# Patient Record
Sex: FEMALE | Race: WHITE | Employment: OTHER | URBAN - METROPOLITAN AREA
[De-identification: names, ages, dates, MRNs, and addresses within clinical notes are randomized per-mention and may not be internally consistent; named-entity substitution may affect disease eponyms.]

---

## 2018-08-31 PROBLEM — R07.2 PRECORDIAL PAIN: Status: ACTIVE | Noted: 2018-08-31

## 2018-08-31 PROBLEM — I10 HTN (HYPERTENSION), BENIGN: Status: ACTIVE | Noted: 2018-08-31

## 2018-08-31 PROBLEM — E11.51 TYPE 2 DIABETES MELLITUS WITH DIABETIC PERIPHERAL ANGIOPATHY WITHOUT GANGRENE, WITHOUT LONG-TERM CURRENT USE OF INSULIN (HCC): Status: ACTIVE | Noted: 2018-08-31

## 2018-08-31 PROBLEM — I73.9 CLAUDICATION (HCC): Status: ACTIVE | Noted: 2018-08-31

## 2018-08-31 PROBLEM — I73.9 PVD (PERIPHERAL VASCULAR DISEASE) (HCC): Status: ACTIVE | Noted: 2018-08-31

## 2018-08-31 PROBLEM — R06.00 DYSPNEA ON EXERTION: Status: ACTIVE | Noted: 2018-08-31

## 2019-01-11 PROBLEM — I35.0 NONRHEUMATIC AORTIC VALVE STENOSIS: Status: ACTIVE | Noted: 2019-01-11

## 2019-04-05 PROBLEM — E78.2 MIXED HYPERLIPIDEMIA: Status: ACTIVE | Noted: 2019-04-05

## 2020-05-18 NOTE — IMAGING NOTE
05/18 @ 9273 Left a detailed message with call back number inquiring about CT contrast dye allergy.  IBSM

## 2020-05-18 NOTE — PROGRESS NOTES
Per Tiffanieblaire Resendiz, no sign or symptoms of covid and no recent travel or exposure. Pt ok for cardiac diagnostic outpatient appointment.

## 2020-05-19 ENCOUNTER — HOSPITAL ENCOUNTER (OUTPATIENT)
Dept: LAB | Facility: HOSPITAL | Age: 70
Discharge: HOME | End: 2020-05-19
Attending: NURSE PRACTITIONER
Payer: MEDICARE

## 2020-05-19 ENCOUNTER — HOSPITAL ENCOUNTER (OUTPATIENT)
Dept: CARDIOLOGY CLINIC | Facility: HOSPITAL | Age: 70
Discharge: HOME | End: 2020-05-19
Attending: NURSE PRACTITIONER
Payer: MEDICARE

## 2020-05-19 ENCOUNTER — NURSE ONLY (OUTPATIENT)
Dept: RESPIRATORY THERAPY | Facility: HOSPITAL | Age: 70
End: 2020-05-19
Attending: NURSE PRACTITIONER
Payer: MEDICARE

## 2020-05-19 ENCOUNTER — HOSPITAL ENCOUNTER (OUTPATIENT)
Dept: CV DIAGNOSTICS | Facility: HOSPITAL | Age: 70
Discharge: HOME | End: 2020-05-19
Attending: NURSE PRACTITIONER
Payer: MEDICARE

## 2020-05-19 ENCOUNTER — HOSPITAL ENCOUNTER (OUTPATIENT)
Dept: CT IMAGING | Facility: HOSPITAL | Age: 70
Discharge: HOME | End: 2020-05-19
Attending: NURSE PRACTITIONER
Payer: MEDICARE

## 2020-05-19 VITALS — DIASTOLIC BLOOD PRESSURE: 57 MMHG | HEART RATE: 74 BPM | SYSTOLIC BLOOD PRESSURE: 116 MMHG

## 2020-05-19 DIAGNOSIS — I35.0 SEVERE AORTIC STENOSIS: ICD-10-CM

## 2020-05-19 DIAGNOSIS — R09.89 OTHER SPECIFIED SYMPTOMS AND SIGNS INVOLVING THE CIRCULATORY AND RESPIRATORY SYSTEMS: ICD-10-CM

## 2020-05-19 PROCEDURE — 71275 CT ANGIOGRAPHY CHEST: CPT | Performed by: NURSE PRACTITIONER

## 2020-05-19 PROCEDURE — 36415 COLL VENOUS BLD VENIPUNCTURE: CPT | Performed by: NURSE PRACTITIONER

## 2020-05-19 PROCEDURE — 85025 COMPLETE CBC W/AUTO DIFF WBC: CPT | Performed by: NURSE PRACTITIONER

## 2020-05-19 PROCEDURE — 93005 ELECTROCARDIOGRAM TRACING: CPT

## 2020-05-19 PROCEDURE — 80048 BASIC METABOLIC PNL TOTAL CA: CPT | Performed by: NURSE PRACTITIONER

## 2020-05-19 PROCEDURE — 93010 ELECTROCARDIOGRAM REPORT: CPT | Performed by: INTERNAL MEDICINE

## 2020-05-19 PROCEDURE — 94010 BREATHING CAPACITY TEST: CPT

## 2020-05-19 PROCEDURE — 75635 CT ANGIO ABDOMINAL ARTERIES: CPT | Performed by: NURSE PRACTITIONER

## 2020-05-19 NOTE — RESPIRATORY THERAPY NOTE
Bedside simple spirometry ( including FVC,FEV1 ) performed. Well tolerated by patient. Good effort. Preliminary results printed out and placed in Pulmonary physicians bin in RT dept for interpretation.  Copy also faxed to [facility] cardiology office

## 2020-05-19 NOTE — IMAGING NOTE
Received pt to CT 4, GE. Pt verified name, , and allergies. Pt states she has taken 13 hour premedication as advised for documented contrast allergy. Injection of contrast at 1223.   Contrast= 95 ml  Saline= 101 ml  Average HR= 75  Pt tolerated exam w

## 2020-05-19 NOTE — CONSULTS
Cardiothoracic Surgery  Structural Heart Consult       Referring: Dr Saturnino Gunter  PCP: Dr Nancy Neal  Reason for consult: severe symptomatic aortic stenosis  81year old with symptomatic aortic stenosis with increased SOB, MELENDEZ, and fatigue  Echo:    GREGORY: .67 cm2 cath was reviewed  Labs were reviewed and pre-op labs have been ordered  CT of chest was ordered    Pt was seen and examined by me today and after reviewing the chart and talking with the patient   the assessment is that the patient is at intermediate risk

## 2020-05-19 NOTE — IMAGING NOTE
Blood sugar reported to Jani LONG. She states this will be brought to the attention of Radha LONG in structural heart clinic.

## 2020-05-22 NOTE — PROCEDURES
Spirometry Findings:  FEV1 is 2.29L, 108% predicted. FVC is 2.64L, 98% predicted. FEV1/ FVC ratio is 0.87. The flow-volume loop demonstrates a normal pattern.     Impression:  There is no airway obstruction on spirometry and visualized   on flow-volume l

## 2020-06-19 ENCOUNTER — LAB ENCOUNTER (OUTPATIENT)
Dept: LAB | Facility: HOSPITAL | Age: 70
DRG: 267 | End: 2020-06-19
Attending: INTERNAL MEDICINE
Payer: MEDICARE

## 2020-06-19 DIAGNOSIS — Z01.818 PRE-OP TESTING: ICD-10-CM

## 2020-06-22 ENCOUNTER — ANESTHESIA EVENT (OUTPATIENT)
Dept: CARDIAC SURGERY | Facility: HOSPITAL | Age: 70
DRG: 267 | End: 2020-06-22
Payer: MEDICARE

## 2020-06-22 ENCOUNTER — APPOINTMENT (OUTPATIENT)
Dept: GENERAL RADIOLOGY | Facility: HOSPITAL | Age: 70
DRG: 267 | End: 2020-06-22
Attending: NURSE PRACTITIONER
Payer: MEDICARE

## 2020-06-22 ENCOUNTER — HOSPITAL ENCOUNTER (INPATIENT)
Facility: HOSPITAL | Age: 70
LOS: 2 days | Discharge: HOME | DRG: 267 | End: 2020-06-24
Attending: INTERNAL MEDICINE | Admitting: INTERNAL MEDICINE
Payer: MEDICARE

## 2020-06-22 DIAGNOSIS — Z01.818 PRE-OP TESTING: Primary | ICD-10-CM

## 2020-06-22 LAB
ALBUMIN SERPL-MCNC: 3.2 G/DL (ref 3.4–5)
ALBUMIN/GLOB SERPL: 0.8 {RATIO} (ref 1–2)
ALP LIVER SERPL-CCNC: 106 U/L (ref 55–142)
ALT SERPL-CCNC: 22 U/L (ref 13–56)
ANION GAP SERPL CALC-SCNC: 5 MMOL/L (ref 0–18)
ANTIBODY SCREEN: NEGATIVE
AST SERPL-CCNC: 19 U/L (ref 15–37)
ATRIAL RATE: 69 BPM
BILIRUB SERPL-MCNC: 0.4 MG/DL (ref 0.1–2)
BUN BLD-MCNC: 18 MG/DL (ref 7–18)
BUN/CREAT SERPL: 20.5 (ref 10–20)
CALCIUM BLD-MCNC: 8.9 MG/DL (ref 8.5–10.1)
CHLORIDE SERPL-SCNC: 106 MMOL/L (ref 98–112)
CO2 SERPL-SCNC: 27 MMOL/L (ref 21–32)
CREAT BLD-MCNC: 0.88 MG/DL (ref 0.55–1.02)
DEPRECATED RDW RBC AUTO: 42.8 FL (ref 35.1–46.3)
ERYTHROCYTE [DISTWIDTH] IN BLOOD BY AUTOMATED COUNT: 13.8 % (ref 11–15)
EST. AVERAGE GLUCOSE BLD GHB EST-MCNC: 232 MG/DL (ref 68–126)
GLOBULIN PLAS-MCNC: 3.8 G/DL (ref 2.8–4.4)
GLUCOSE BLD-MCNC: 199 MG/DL (ref 70–99)
GLUCOSE BLD-MCNC: 209 MG/DL (ref 70–99)
GLUCOSE BLD-MCNC: 221 MG/DL (ref 70–99)
GLUCOSE BLD-MCNC: 224 MG/DL (ref 70–99)
HBA1C MFR BLD HPLC: 9.7 % (ref ?–5.7)
HCT VFR BLD AUTO: 36.3 % (ref 35–48)
HGB BLD-MCNC: 11.6 G/DL (ref 12–16)
INR BLD: 1.04 (ref 0.89–1.11)
M PROTEIN MFR SERPL ELPH: 7 G/DL (ref 6.4–8.2)
MCH RBC QN AUTO: 27.2 PG (ref 26–34)
MCHC RBC AUTO-ENTMCNC: 32 G/DL (ref 31–37)
MCV RBC AUTO: 85 FL (ref 80–100)
NT-PROBNP SERPL-MCNC: 109 PG/ML (ref ?–125)
OSMOLALITY SERPL CALC.SUM OF ELEC: 295 MOSM/KG (ref 275–295)
P AXIS: 33 DEGREES
P-R INTERVAL: 154 MS
PLATELET # BLD AUTO: 169 10(3)UL (ref 150–450)
POTASSIUM SERPL-SCNC: 4.2 MMOL/L (ref 3.5–5.1)
PSA SERPL DL<=0.01 NG/ML-MCNC: 13.9 SECONDS (ref 12.4–14.6)
Q-T INTERVAL: 410 MS
QRS DURATION: 76 MS
QTC CALCULATION (BEZET): 439 MS
R AXIS: 19 DEGREES
RBC # BLD AUTO: 4.27 X10(6)UL (ref 3.8–5.3)
RH BLOOD TYPE: NEGATIVE
SODIUM SERPL-SCNC: 138 MMOL/L (ref 136–145)
T AXIS: 19 DEGREES
VENTRICULAR RATE: 69 BPM
WBC # BLD AUTO: 8.2 X10(3) UL (ref 4–11)

## 2020-06-22 PROCEDURE — 71045 X-RAY EXAM CHEST 1 VIEW: CPT | Performed by: NURSE PRACTITIONER

## 2020-06-22 PROCEDURE — 85610 PROTHROMBIN TIME: CPT | Performed by: NURSE PRACTITIONER

## 2020-06-22 PROCEDURE — 87081 CULTURE SCREEN ONLY: CPT | Performed by: NURSE PRACTITIONER

## 2020-06-22 PROCEDURE — 76937 US GUIDE VASCULAR ACCESS: CPT

## 2020-06-22 PROCEDURE — 86920 COMPATIBILITY TEST SPIN: CPT

## 2020-06-22 PROCEDURE — 85027 COMPLETE CBC AUTOMATED: CPT | Performed by: NURSE PRACTITIONER

## 2020-06-22 PROCEDURE — 93005 ELECTROCARDIOGRAM TRACING: CPT

## 2020-06-22 PROCEDURE — 80053 COMPREHEN METABOLIC PANEL: CPT | Performed by: NURSE PRACTITIONER

## 2020-06-22 PROCEDURE — 86850 RBC ANTIBODY SCREEN: CPT | Performed by: NURSE PRACTITIONER

## 2020-06-22 PROCEDURE — 86901 BLOOD TYPING SEROLOGIC RH(D): CPT | Performed by: NURSE PRACTITIONER

## 2020-06-22 PROCEDURE — 83880 ASSAY OF NATRIURETIC PEPTIDE: CPT | Performed by: NURSE PRACTITIONER

## 2020-06-22 PROCEDURE — 82962 GLUCOSE BLOOD TEST: CPT

## 2020-06-22 PROCEDURE — 93010 ELECTROCARDIOGRAM REPORT: CPT | Performed by: INTERNAL MEDICINE

## 2020-06-22 PROCEDURE — 83036 HEMOGLOBIN GLYCOSYLATED A1C: CPT | Performed by: NURSE PRACTITIONER

## 2020-06-22 PROCEDURE — 86900 BLOOD TYPING SEROLOGIC ABO: CPT | Performed by: NURSE PRACTITIONER

## 2020-06-22 RX ORDER — BUPROPION HYDROCHLORIDE 150 MG/1
450 TABLET ORAL DAILY
Status: DISCONTINUED | OUTPATIENT
Start: 2020-06-23 | End: 2020-06-24

## 2020-06-22 RX ORDER — ACETAMINOPHEN 160 MG
2000 TABLET,DISINTEGRATING ORAL 2 TIMES DAILY
Status: DISCONTINUED | OUTPATIENT
Start: 2020-06-22 | End: 2020-06-24

## 2020-06-22 RX ORDER — LEVOTHYROXINE SODIUM 0.05 MG/1
50 TABLET ORAL
Status: DISCONTINUED | OUTPATIENT
Start: 2020-06-23 | End: 2020-06-24

## 2020-06-22 RX ORDER — DEXTROSE MONOHYDRATE 25 G/50ML
50 INJECTION, SOLUTION INTRAVENOUS
Status: DISCONTINUED | OUTPATIENT
Start: 2020-06-22 | End: 2020-06-24

## 2020-06-22 RX ORDER — METOPROLOL SUCCINATE 50 MG/1
50 TABLET, EXTENDED RELEASE ORAL DAILY
Status: DISCONTINUED | OUTPATIENT
Start: 2020-06-23 | End: 2020-06-22

## 2020-06-22 RX ORDER — ATORVASTATIN CALCIUM 20 MG/1
20 TABLET, FILM COATED ORAL NIGHTLY
Status: DISCONTINUED | OUTPATIENT
Start: 2020-06-22 | End: 2020-06-24

## 2020-06-22 RX ORDER — METOPROLOL SUCCINATE 50 MG/1
50 TABLET, EXTENDED RELEASE ORAL DAILY
Status: DISCONTINUED | OUTPATIENT
Start: 2020-06-22 | End: 2020-06-23

## 2020-06-22 RX ORDER — SODIUM CHLORIDE 9 MG/ML
INJECTION, SOLUTION INTRAVENOUS CONTINUOUS
Status: DISCONTINUED | OUTPATIENT
Start: 2020-06-22 | End: 2020-06-23

## 2020-06-22 RX ORDER — PANTOPRAZOLE SODIUM 20 MG/1
20 TABLET, DELAYED RELEASE ORAL
Status: DISCONTINUED | OUTPATIENT
Start: 2020-06-23 | End: 2020-06-24

## 2020-06-22 RX ORDER — SERTRALINE HYDROCHLORIDE 100 MG/1
100 TABLET, FILM COATED ORAL DAILY
Status: DISCONTINUED | OUTPATIENT
Start: 2020-06-23 | End: 2020-06-24

## 2020-06-22 RX ORDER — TRAMADOL HYDROCHLORIDE 50 MG/1
50 TABLET ORAL EVERY 6 HOURS PRN
Status: DISCONTINUED | OUTPATIENT
Start: 2020-06-22 | End: 2020-06-24

## 2020-06-22 RX ORDER — ACETAMINOPHEN 325 MG/1
650 TABLET ORAL EVERY 6 HOURS PRN
Status: DISCONTINUED | OUTPATIENT
Start: 2020-06-22 | End: 2020-06-24

## 2020-06-22 RX ORDER — CHLORHEXIDINE GLUCONATE 4 G/100ML
30 SOLUTION TOPICAL ONCE
Status: COMPLETED | OUTPATIENT
Start: 2020-06-22 | End: 2020-06-22

## 2020-06-22 NOTE — H&P
[facility] Cardiology  History and Physical    [facility] Patient Status:  Inpatient    1950 MRN [id number]   [facility] 8NE-A Attending Deanna Dyer MD   Hosp Day # 0 PCP Linh Garcia     ADDENDUM:  The patient of the TAVR procedure. Patient verbalized understanding and agree with plan of care. Objective:  /50   Pulse 75   Wt 198 lb 13.7 oz (90.2 kg)   BMI 36.37 kg/m²   No data recorded.     No intake or output data in the 24 hours ending 06/22/20 124 tolerating medications with no reported side effects.     They currently use no assistive devices or home health services.  They do not use home oxygen.     Previous HX:   Denies the following:  Infective endocarditis  Pacemaker, ICD  CABG, PCI, other cardi mg by mouth daily. , Disp: , Rfl:   •  Amphetamine-Dextroamphet ER 20 MG Oral Capsule SR 24 Hr, Take 20 mg by mouth daily. , Disp: , Rfl:   •  buPROPion HCl ER, XL, 450 MG Oral Tablet 24 Hr, Take 450 mg by mouth daily. , Disp: , Rfl: 0  •  Metoprolol Succ normoactive BS, soft, non-distended; non-tender; no masses or bruits     STS, KCCQ & 5 meter walk:   STS 2.97% and EURO 1.98 %.       SYLVESTER score is 6/6 .       ECHO 3/18//20 at [address] [place] [address]: See full report   Summary:     1. Left ventricle:  The cavity size is · Patient will be seen by a CV surgeon     HTN (hypertension), benign  · BP elevated today. Pt anxious about visit. She states BP at home in am usually 120's/60's.    · Recommend checking BP at home daily and recording  ·    Mixed hyperlipidemia  · On ronald

## 2020-06-22 NOTE — PROGRESS NOTES
Pt oriented to room. Call light in reach. Menu in room. Tele monitor on. VS taken. Head to toe complete. Admission Navigators complete including PTA medications. Consults aware of proper medications and of new patient. Patient's needs met by staff.

## 2020-06-22 NOTE — PLAN OF CARE
KCCQ complete. Blood consent signed and in chart. MRSA swab sent.    5 Meter Walk complete:  1st: 10.70 sec  2nd: 10.30 sec  3rd: 8.70 sec    Problem: Diabetes/Glucose Control  Goal: Glucose maintained within prescribed range  Description  INTERVENTIONS:  - baseline  Description  INTERVENTIONS:  - Continuous cardiac monitoring, monitor vital signs, obtain 12 lead EKG if indicated  - Evaluate effectiveness of antiarrhythmic and heart rate control medications as ordered  - Initiate emergency measures for life t

## 2020-06-22 NOTE — ANESTHESIA PREPROCEDURE EVALUATION
PRE-OP EVALUATION    Patient Name: Wilfred Mata    Pre-op Diagnosis: aortic stenosis    Procedure(s):  transcatheter aortic valve replacement, 26mm Evolut Pro    Surgeon(s) and Role:     * Allie Jasmine MD - Primary     * Esperanza Montalvo MD    Pre-op Endo/Other      (+) diabetes  type 2,                          Pulmonary               (+) shortness of breath            Neuro/Psych                                    Past Surgical History:   Procedure Laterality Date   • ANGIOGRAM  02/

## 2020-06-22 NOTE — CONSULTS
[facility] Hospitalist H&P       CC: medicine consult for comanagement of medical conditions     PCP: Veda Servin    History of Present Illness: Patient is a 81year old female with PMH sig for T2DM, HTN, HL, MONIQUE and hypothyroidism is admitted for TAVR.   P mouth before breakfast., Disp: , Rfl:   insulin detemir (LEVEMIR FLEXTOUCH) 100 UNIT/ML Subcutaneous Solution Pen-injector, Inject into the skin nightly., Disp: , Rfl:   Lansoprazole 15 MG Oral Capsule Delayed Release, Take 15 mg by mouth daily. , Disp: , R Neurologic: Normal strength, no focal deficit appreciated     Diagnostic Data:    CBC/Chem  No results for input(s): WBC, HGB, MCV, PLT, BAND, INR in the last 168 hours.     Invalid input(s): LYM#, MONO#, BASOS#, EOSIN#    No results for input(s): NA, K,

## 2020-06-23 ENCOUNTER — ANESTHESIA (OUTPATIENT)
Dept: CARDIAC SURGERY | Facility: HOSPITAL | Age: 70
DRG: 267 | End: 2020-06-23
Payer: MEDICARE

## 2020-06-23 ENCOUNTER — APPOINTMENT (OUTPATIENT)
Dept: CV DIAGNOSTICS | Facility: HOSPITAL | Age: 70
DRG: 267 | End: 2020-06-23
Attending: INTERNAL MEDICINE
Payer: MEDICARE

## 2020-06-23 ENCOUNTER — APPOINTMENT (OUTPATIENT)
Dept: GENERAL RADIOLOGY | Facility: HOSPITAL | Age: 70
DRG: 267 | End: 2020-06-23
Attending: NURSE PRACTITIONER
Payer: MEDICARE

## 2020-06-23 ENCOUNTER — APPOINTMENT (OUTPATIENT)
Dept: CV DIAGNOSTICS | Facility: HOSPITAL | Age: 70
DRG: 267 | End: 2020-06-23
Attending: NURSE PRACTITIONER
Payer: MEDICARE

## 2020-06-23 LAB
ALBUMIN SERPL-MCNC: 2.3 G/DL (ref 3.4–5)
ALBUMIN/GLOB SERPL: 0.8 {RATIO} (ref 1–2)
ALP LIVER SERPL-CCNC: 63 U/L (ref 55–142)
ALT SERPL-CCNC: 17 U/L (ref 13–56)
ANION GAP SERPL CALC-SCNC: 4 MMOL/L (ref 0–18)
ANION GAP SERPL CALC-SCNC: 5 MMOL/L (ref 0–18)
ARTERIAL BLD GAS O2 SATURATION: 97 % (ref 92–100)
ARTERIAL BLOOD GAS BASE EXCESS: -4.9 MMOL/L (ref ?–2)
ARTERIAL BLOOD GAS HCO3: 20.8 MEQ/L (ref 22–26)
ARTERIAL BLOOD GAS PCO2: 40 MM HG (ref 35–45)
ARTERIAL BLOOD GAS PH: 7.33 (ref 7.35–7.45)
ARTERIAL BLOOD GAS PO2: 117 MM HG (ref 80–105)
AST SERPL-CCNC: 17 U/L (ref 15–37)
ATRIAL RATE: 75 BPM
BASOPHILS # BLD AUTO: 0.02 X10(3) UL (ref 0–0.2)
BASOPHILS NFR BLD AUTO: 0.2 %
BILIRUB SERPL-MCNC: 0.4 MG/DL (ref 0.1–2)
BUN BLD-MCNC: 17 MG/DL (ref 7–18)
BUN BLD-MCNC: 18 MG/DL (ref 7–18)
BUN/CREAT SERPL: 20.7 (ref 10–20)
BUN/CREAT SERPL: 22.5 (ref 10–20)
CALCIUM BLD-MCNC: 7.2 MG/DL (ref 8.5–10.1)
CALCIUM BLD-MCNC: 8.9 MG/DL (ref 8.5–10.1)
CALCULATED O2 SATURATION: 98 % (ref 92–100)
CARBOXYHEMOGLOBIN: 1.2 % SAT (ref 0–3)
CHLORIDE SERPL-SCNC: 108 MMOL/L (ref 98–112)
CHLORIDE SERPL-SCNC: 113 MMOL/L (ref 98–112)
CO2 SERPL-SCNC: 24 MMOL/L (ref 21–32)
CO2 SERPL-SCNC: 28 MMOL/L (ref 21–32)
CREAT BLD-MCNC: 0.8 MG/DL (ref 0.55–1.02)
CREAT BLD-MCNC: 0.82 MG/DL (ref 0.55–1.02)
DEPRECATED RDW RBC AUTO: 42.5 FL (ref 35.1–46.3)
DEPRECATED RDW RBC AUTO: 42.5 FL (ref 35.1–46.3)
DEPRECATED RDW RBC AUTO: 43.3 FL (ref 35.1–46.3)
EOSINOPHIL # BLD AUTO: 0.01 X10(3) UL (ref 0–0.7)
EOSINOPHIL NFR BLD AUTO: 0.1 %
ERYTHROCYTE [DISTWIDTH] IN BLOOD BY AUTOMATED COUNT: 13.8 % (ref 11–15)
ERYTHROCYTE [DISTWIDTH] IN BLOOD BY AUTOMATED COUNT: 13.8 % (ref 11–15)
ERYTHROCYTE [DISTWIDTH] IN BLOOD BY AUTOMATED COUNT: 14.1 % (ref 11–15)
EST. AVERAGE GLUCOSE BLD GHB EST-MCNC: 235 MG/DL (ref 68–126)
FIO2: 40 %
GLOBULIN PLAS-MCNC: 2.9 G/DL (ref 2.8–4.4)
GLUCOSE BLD-MCNC: 112 MG/DL (ref 70–99)
GLUCOSE BLD-MCNC: 124 MG/DL (ref 70–99)
GLUCOSE BLD-MCNC: 146 MG/DL (ref 70–99)
GLUCOSE BLD-MCNC: 154 MG/DL (ref 70–99)
GLUCOSE BLD-MCNC: 179 MG/DL (ref 70–99)
GLUCOSE BLD-MCNC: 222 MG/DL (ref 70–99)
GLUCOSE BLD-MCNC: 223 MG/DL (ref 70–99)
GLUCOSE BLD-MCNC: 269 MG/DL (ref 70–99)
GLUCOSE BLD-MCNC: 285 MG/DL (ref 70–99)
HAV IGM SER QL: 2 MG/DL (ref 1.6–2.6)
HBA1C MFR BLD HPLC: 9.8 % (ref ?–5.7)
HCT VFR BLD AUTO: 34.9 % (ref 35–48)
HCT VFR BLD AUTO: 35.6 % (ref 35–48)
HCT VFR BLD AUTO: 36.1 % (ref 35–48)
HGB BLD-MCNC: 11.3 G/DL (ref 12–16)
HGB BLD-MCNC: 11.4 G/DL (ref 12–16)
HGB BLD-MCNC: 11.9 G/DL (ref 12–16)
IMM GRANULOCYTES # BLD AUTO: 0.07 X10(3) UL (ref 0–1)
IMM GRANULOCYTES NFR BLD: 0.6 %
ISTAT ACTIVATED CLOTTING TIME: 142 SECONDS (ref 74–137)
ISTAT ACTIVATED CLOTTING TIME: 147 SECONDS (ref 74–137)
ISTAT ACTIVATED CLOTTING TIME: 169 SECONDS (ref 74–137)
ISTAT ACTIVATED CLOTTING TIME: 401 SECONDS (ref 74–137)
ISTAT BLOOD GAS BASE EXCESS: -1 MMOL/L (ref ?–30)
ISTAT BLOOD GAS BASE EXCESS: -2 MMOL/L (ref ?–30)
ISTAT BLOOD GAS BASE EXCESS: -3 MMOL/L (ref ?–30)
ISTAT BLOOD GAS HCO3: 23.1 MEQ/L (ref 22–26)
ISTAT BLOOD GAS HCO3: 23.3 MEQ/L (ref 22–26)
ISTAT BLOOD GAS HCO3: 25.8 MEQ/L (ref 22–26)
ISTAT BLOOD GAS O2 SATURATION: 100 % (ref 92–100)
ISTAT BLOOD GAS O2 SATURATION: 100 % (ref 92–100)
ISTAT BLOOD GAS O2 SATURATION: 99 % (ref 92–100)
ISTAT BLOOD GAS PCO2: 42 MMHG (ref 35–45)
ISTAT BLOOD GAS PCO2: 45.7 MMHG (ref 35–45)
ISTAT BLOOD GAS PCO2: 54.9 MMHG (ref 35–45)
ISTAT BLOOD GAS PH: 7.28 (ref 7.35–7.45)
ISTAT BLOOD GAS PH: 7.31 (ref 7.35–7.45)
ISTAT BLOOD GAS PH: 7.35 (ref 7.35–7.45)
ISTAT BLOOD GAS PO2: 154 MMHG (ref 80–105)
ISTAT BLOOD GAS PO2: 322 MMHG (ref 80–105)
ISTAT BLOOD GAS PO2: 329 MMHG (ref 80–105)
ISTAT BLOOD GAS TCO2: 25 MMOL/L (ref 22–32)
ISTAT BLOOD GAS TCO2: 25 MMOL/L (ref 22–32)
ISTAT BLOOD GAS TCO2: 27 MMOL/L (ref 22–32)
ISTAT HEMATOCRIT: 25 % (ref 34–50)
ISTAT HEMATOCRIT: 27 % (ref 34–50)
ISTAT HEMATOCRIT: 31 % (ref 34–50)
ISTAT IONIZED CALCIUM: 1.09 MMOL/L (ref 1.12–1.32)
ISTAT IONIZED CALCIUM: 1.11 MMOL/L (ref 1.12–1.32)
ISTAT IONIZED CALCIUM: 1.22 MMOL/L (ref 1.12–1.32)
ISTAT POTASSIUM: 3.6 MMOL/L (ref 3.6–5.1)
ISTAT POTASSIUM: 4 MMOL/L (ref 3.6–5.1)
ISTAT POTASSIUM: 4.3 MMOL/L (ref 3.6–5.1)
ISTAT SODIUM: 141 MMOL/L (ref 136–145)
ISTAT SODIUM: 142 MMOL/L (ref 136–145)
ISTAT SODIUM: 144 MMOL/L (ref 136–145)
LYMPHOCYTES # BLD AUTO: 0.79 X10(3) UL (ref 1–4)
LYMPHOCYTES NFR BLD AUTO: 6.6 %
M PROTEIN MFR SERPL ELPH: 5.2 G/DL (ref 6.4–8.2)
MCH RBC QN AUTO: 27.2 PG (ref 26–34)
MCH RBC QN AUTO: 27.4 PG (ref 26–34)
MCH RBC QN AUTO: 27.5 PG (ref 26–34)
MCHC RBC AUTO-ENTMCNC: 32 G/DL (ref 31–37)
MCHC RBC AUTO-ENTMCNC: 32.4 G/DL (ref 31–37)
MCHC RBC AUTO-ENTMCNC: 33 G/DL (ref 31–37)
MCV RBC AUTO: 83.2 FL (ref 80–100)
MCV RBC AUTO: 83.9 FL (ref 80–100)
MCV RBC AUTO: 85.8 FL (ref 80–100)
METHEMOGLOBIN: 0.6 % SAT (ref 0.4–1.5)
MONOCYTES # BLD AUTO: 0.23 X10(3) UL (ref 0.1–1)
MONOCYTES NFR BLD AUTO: 1.9 %
NEUTROPHILS # BLD AUTO: 10.87 X10 (3) UL (ref 1.5–7.7)
NEUTROPHILS # BLD AUTO: 10.87 X10(3) UL (ref 1.5–7.7)
NEUTROPHILS NFR BLD AUTO: 90.6 %
OSMOLALITY SERPL CALC.SUM OF ELEC: 295 MOSM/KG (ref 275–295)
OSMOLALITY SERPL CALC.SUM OF ELEC: 302 MOSM/KG (ref 275–295)
P AXIS: 43 DEGREES
P-R INTERVAL: 164 MS
P/F RATIO: 301.9 MMHG
PATIENT TEMPERATURE: 97.6 F
PEEP: 5 CM H2O
PLATELET # BLD AUTO: 147 10(3)UL (ref 150–450)
PLATELET # BLD AUTO: 172 10(3)UL (ref 150–450)
PLATELET # BLD AUTO: 183 10(3)UL (ref 150–450)
POTASSIUM SERPL-SCNC: 3.8 MMOL/L (ref 3.5–5.1)
POTASSIUM SERPL-SCNC: 4.3 MMOL/L (ref 3.5–5.1)
PRESSURE SUPPORT: 5 CM H2O
Q-T INTERVAL: 422 MS
QRS DURATION: 74 MS
QTC CALCULATION (BEZET): 471 MS
R AXIS: 11 DEGREES
RBC # BLD AUTO: 4.15 X10(6)UL (ref 3.8–5.3)
RBC # BLD AUTO: 4.16 X10(6)UL (ref 3.8–5.3)
RBC # BLD AUTO: 4.34 X10(6)UL (ref 3.8–5.3)
SODIUM SERPL-SCNC: 140 MMOL/L (ref 136–145)
SODIUM SERPL-SCNC: 142 MMOL/L (ref 136–145)
T AXIS: 23 DEGREES
TOTAL HEMOGLOBIN: 11.8 G/DL (ref 11.7–16)
VENTRICULAR RATE: 75 BPM
WBC # BLD AUTO: 12 X10(3) UL (ref 4–11)
WBC # BLD AUTO: 18.7 X10(3) UL (ref 4–11)
WBC # BLD AUTO: 8.8 X10(3) UL (ref 4–11)

## 2020-06-23 PROCEDURE — 94150 VITAL CAPACITY TEST: CPT

## 2020-06-23 PROCEDURE — 83036 HEMOGLOBIN GLYCOSYLATED A1C: CPT | Performed by: HOSPITALIST

## 2020-06-23 PROCEDURE — 83050 HGB METHEMOGLOBIN QUAN: CPT | Performed by: THORACIC SURGERY (CARDIOTHORACIC VASCULAR SURGERY)

## 2020-06-23 PROCEDURE — 84132 ASSAY OF SERUM POTASSIUM: CPT

## 2020-06-23 PROCEDURE — 82803 BLOOD GASES ANY COMBINATION: CPT | Performed by: THORACIC SURGERY (CARDIOTHORACIC VASCULAR SURGERY)

## 2020-06-23 PROCEDURE — 93355 ECHO TRANSESOPHAGEAL (TEE): CPT | Performed by: INTERNAL MEDICINE

## 2020-06-23 PROCEDURE — 85027 COMPLETE CBC AUTOMATED: CPT | Performed by: NURSE PRACTITIONER

## 2020-06-23 PROCEDURE — B410YZZ FLUOROSCOPY OF ABDOMINAL AORTA USING OTHER CONTRAST: ICD-10-PCS | Performed by: INTERNAL MEDICINE

## 2020-06-23 PROCEDURE — 85347 COAGULATION TIME ACTIVATED: CPT

## 2020-06-23 PROCEDURE — 80048 BASIC METABOLIC PNL TOTAL CA: CPT | Performed by: HOSPITALIST

## 2020-06-23 PROCEDURE — B310YZZ FLUOROSCOPY OF THORACIC AORTA USING OTHER CONTRAST: ICD-10-PCS | Performed by: INTERNAL MEDICINE

## 2020-06-23 PROCEDURE — B41FYZZ FLUOROSCOPY OF RIGHT LOWER EXTREMITY ARTERIES USING OTHER CONTRAST: ICD-10-PCS | Performed by: INTERNAL MEDICINE

## 2020-06-23 PROCEDURE — 71045 X-RAY EXAM CHEST 1 VIEW: CPT | Performed by: NURSE PRACTITIONER

## 2020-06-23 PROCEDURE — 82375 ASSAY CARBOXYHB QUANT: CPT | Performed by: THORACIC SURGERY (CARDIOTHORACIC VASCULAR SURGERY)

## 2020-06-23 PROCEDURE — 83735 ASSAY OF MAGNESIUM: CPT | Performed by: HOSPITALIST

## 2020-06-23 PROCEDURE — B24BZZ4 ULTRASONOGRAPHY OF HEART WITH AORTA, TRANSESOPHAGEAL: ICD-10-PCS | Performed by: INTERNAL MEDICINE

## 2020-06-23 PROCEDURE — 85018 HEMOGLOBIN: CPT | Performed by: THORACIC SURGERY (CARDIOTHORACIC VASCULAR SURGERY)

## 2020-06-23 PROCEDURE — 36430 TRANSFUSION BLD/BLD COMPNT: CPT | Performed by: INTERNAL MEDICINE

## 2020-06-23 PROCEDURE — B41CYZZ FLUOROSCOPY OF PELVIC ARTERIES USING OTHER CONTRAST: ICD-10-PCS | Performed by: INTERNAL MEDICINE

## 2020-06-23 PROCEDURE — 85025 COMPLETE CBC W/AUTO DIFF WBC: CPT | Performed by: INTERNAL MEDICINE

## 2020-06-23 PROCEDURE — 85027 COMPLETE CBC AUTOMATED: CPT | Performed by: HOSPITALIST

## 2020-06-23 PROCEDURE — 84295 ASSAY OF SERUM SODIUM: CPT

## 2020-06-23 PROCEDURE — 76942 ECHO GUIDE FOR BIOPSY: CPT | Performed by: INTERNAL MEDICINE

## 2020-06-23 PROCEDURE — 80053 COMPREHEN METABOLIC PANEL: CPT | Performed by: HOSPITALIST

## 2020-06-23 PROCEDURE — 02RF38Z REPLACEMENT OF AORTIC VALVE WITH ZOOPLASTIC TISSUE, PERCUTANEOUS APPROACH: ICD-10-PCS | Performed by: INTERNAL MEDICINE

## 2020-06-23 PROCEDURE — 82962 GLUCOSE BLOOD TEST: CPT

## 2020-06-23 PROCEDURE — 85014 HEMATOCRIT: CPT

## 2020-06-23 PROCEDURE — 94002 VENT MGMT INPAT INIT DAY: CPT

## 2020-06-23 PROCEDURE — 82803 BLOOD GASES ANY COMBINATION: CPT

## 2020-06-23 PROCEDURE — 30233N1 TRANSFUSION OF NONAUTOLOGOUS RED BLOOD CELLS INTO PERIPHERAL VEIN, PERCUTANEOUS APPROACH: ICD-10-PCS | Performed by: INTERNAL MEDICINE

## 2020-06-23 PROCEDURE — 04HK3DZ INSERTION OF INTRALUMINAL DEVICE INTO RIGHT FEMORAL ARTERY, PERCUTANEOUS APPROACH: ICD-10-PCS | Performed by: INTERNAL MEDICINE

## 2020-06-23 PROCEDURE — 82330 ASSAY OF CALCIUM: CPT

## 2020-06-23 PROCEDURE — 93005 ELECTROCARDIOGRAM TRACING: CPT

## 2020-06-23 PROCEDURE — 93010 ELECTROCARDIOGRAM REPORT: CPT | Performed by: INTERNAL MEDICINE

## 2020-06-23 DEVICE — VALVE EVOLUT CORE 26MM: Type: IMPLANTABLE DEVICE | Site: HEART | Status: FUNCTIONAL

## 2020-06-23 RX ORDER — DOBUTAMINE HYDROCHLORIDE 200 MG/100ML
INJECTION INTRAVENOUS CONTINUOUS PRN
Status: DISCONTINUED | OUTPATIENT
Start: 2020-06-23 | End: 2020-06-24

## 2020-06-23 RX ORDER — FAMOTIDINE 10 MG/ML
20 INJECTION, SOLUTION INTRAVENOUS 2 TIMES DAILY
Status: DISCONTINUED | OUTPATIENT
Start: 2020-06-23 | End: 2020-06-23

## 2020-06-23 RX ORDER — SODIUM PHOSPHATE, DIBASIC AND SODIUM PHOSPHATE, MONOBASIC 7; 19 G/133ML; G/133ML
1 ENEMA RECTAL ONCE AS NEEDED
Status: DISCONTINUED | OUTPATIENT
Start: 2020-06-23 | End: 2020-06-24

## 2020-06-23 RX ORDER — DIPHENHYDRAMINE HYDROCHLORIDE 50 MG/ML
50 INJECTION INTRAMUSCULAR; INTRAVENOUS ONCE
Status: COMPLETED | OUTPATIENT
Start: 2020-06-23 | End: 2020-06-23

## 2020-06-23 RX ORDER — METOCLOPRAMIDE HYDROCHLORIDE 5 MG/ML
10 INJECTION INTRAMUSCULAR; INTRAVENOUS EVERY 8 HOURS PRN
Status: DISCONTINUED | OUTPATIENT
Start: 2020-06-23 | End: 2020-06-24

## 2020-06-23 RX ORDER — DEXTROSE MONOHYDRATE 25 G/50ML
50 INJECTION, SOLUTION INTRAVENOUS
Status: DISCONTINUED | OUTPATIENT
Start: 2020-06-23 | End: 2020-06-23

## 2020-06-23 RX ORDER — LIDOCAINE HYDROCHLORIDE 10 MG/ML
INJECTION, SOLUTION EPIDURAL; INFILTRATION; INTRACAUDAL; PERINEURAL AS NEEDED
Status: DISCONTINUED | OUTPATIENT
Start: 2020-06-23 | End: 2020-06-23 | Stop reason: SURG

## 2020-06-23 RX ORDER — NITROGLYCERIN 20 MG/100ML
INJECTION INTRAVENOUS CONTINUOUS PRN
Status: DISCONTINUED | OUTPATIENT
Start: 2020-06-23 | End: 2020-06-24

## 2020-06-23 RX ORDER — MIDAZOLAM HYDROCHLORIDE 1 MG/ML
1 INJECTION INTRAMUSCULAR; INTRAVENOUS EVERY 30 MIN PRN
Status: DISCONTINUED | OUTPATIENT
Start: 2020-06-23 | End: 2020-06-23

## 2020-06-23 RX ORDER — ROCURONIUM BROMIDE 10 MG/ML
INJECTION, SOLUTION INTRAVENOUS AS NEEDED
Status: DISCONTINUED | OUTPATIENT
Start: 2020-06-23 | End: 2020-06-23 | Stop reason: SURG

## 2020-06-23 RX ORDER — MIDAZOLAM HYDROCHLORIDE 1 MG/ML
INJECTION INTRAMUSCULAR; INTRAVENOUS AS NEEDED
Status: DISCONTINUED | OUTPATIENT
Start: 2020-06-23 | End: 2020-06-23 | Stop reason: SURG

## 2020-06-23 RX ORDER — HEPARIN SODIUM 1000 [USP'U]/ML
INJECTION, SOLUTION INTRAVENOUS; SUBCUTANEOUS AS NEEDED
Status: DISCONTINUED | OUTPATIENT
Start: 2020-06-23 | End: 2020-06-23 | Stop reason: SURG

## 2020-06-23 RX ORDER — ALBUMIN, HUMAN INJ 5% 5 %
250 SOLUTION INTRAVENOUS ONCE AS NEEDED
Status: ACTIVE | OUTPATIENT
Start: 2020-06-23 | End: 2020-06-23

## 2020-06-23 RX ORDER — PHENYLEPHRINE HCL 10 MG/ML
VIAL (ML) INJECTION AS NEEDED
Status: DISCONTINUED | OUTPATIENT
Start: 2020-06-23 | End: 2020-06-23 | Stop reason: SURG

## 2020-06-23 RX ORDER — MEPERIDINE HYDROCHLORIDE 25 MG/ML
12.5 INJECTION INTRAMUSCULAR; INTRAVENOUS; SUBCUTANEOUS AS NEEDED
Status: DISCONTINUED | OUTPATIENT
Start: 2020-06-23 | End: 2020-06-23

## 2020-06-23 RX ORDER — HYDROMORPHONE HYDROCHLORIDE 1 MG/ML
0.4 INJECTION, SOLUTION INTRAMUSCULAR; INTRAVENOUS; SUBCUTANEOUS EVERY 5 MIN PRN
Status: DISPENSED | OUTPATIENT
Start: 2020-06-23 | End: 2020-06-23

## 2020-06-23 RX ORDER — CLOPIDOGREL BISULFATE 75 MG/1
75 TABLET ORAL DAILY
Status: DISCONTINUED | OUTPATIENT
Start: 2020-06-23 | End: 2020-06-23

## 2020-06-23 RX ORDER — CHLORHEXIDINE GLUCONATE 0.12 MG/ML
15 RINSE ORAL
Status: DISCONTINUED | OUTPATIENT
Start: 2020-06-23 | End: 2020-06-23

## 2020-06-23 RX ORDER — METHYLPREDNISOLONE SODIUM SUCCINATE 125 MG/2ML
125 INJECTION, POWDER, LYOPHILIZED, FOR SOLUTION INTRAMUSCULAR; INTRAVENOUS ONCE
Status: COMPLETED | OUTPATIENT
Start: 2020-06-23 | End: 2020-06-23

## 2020-06-23 RX ORDER — FAMOTIDINE 20 MG/1
20 TABLET ORAL 2 TIMES DAILY
Status: DISCONTINUED | OUTPATIENT
Start: 2020-06-23 | End: 2020-06-23

## 2020-06-23 RX ORDER — DOCUSATE SODIUM 100 MG/1
100 CAPSULE, LIQUID FILLED ORAL 2 TIMES DAILY
Status: DISCONTINUED | OUTPATIENT
Start: 2020-06-23 | End: 2020-06-24

## 2020-06-23 RX ORDER — BISACODYL 10 MG
10 SUPPOSITORY, RECTAL RECTAL
Status: DISCONTINUED | OUTPATIENT
Start: 2020-06-23 | End: 2020-06-24

## 2020-06-23 RX ORDER — ONDANSETRON 2 MG/ML
4 INJECTION INTRAMUSCULAR; INTRAVENOUS AS NEEDED
Status: ACTIVE | OUTPATIENT
Start: 2020-06-23 | End: 2020-06-23

## 2020-06-23 RX ORDER — ONDANSETRON 2 MG/ML
4 INJECTION INTRAMUSCULAR; INTRAVENOUS EVERY 6 HOURS PRN
Status: DISCONTINUED | OUTPATIENT
Start: 2020-06-23 | End: 2020-06-24

## 2020-06-23 RX ORDER — CEFAZOLIN SODIUM/WATER 2 G/20 ML
2 SYRINGE (ML) INTRAVENOUS EVERY 8 HOURS
Status: COMPLETED | OUTPATIENT
Start: 2020-06-23 | End: 2020-06-24

## 2020-06-23 RX ORDER — IPRATROPIUM BROMIDE AND ALBUTEROL SULFATE 2.5; .5 MG/3ML; MG/3ML
3 SOLUTION RESPIRATORY (INHALATION) EVERY 4 HOURS PRN
Status: DISCONTINUED | OUTPATIENT
Start: 2020-06-23 | End: 2020-06-23

## 2020-06-23 RX ORDER — HYDRALAZINE HYDROCHLORIDE 20 MG/ML
10 INJECTION INTRAMUSCULAR; INTRAVENOUS EVERY 2 HOUR PRN
Status: DISCONTINUED | OUTPATIENT
Start: 2020-06-23 | End: 2020-06-24

## 2020-06-23 RX ORDER — ASPIRIN 81 MG/1
81 TABLET ORAL DAILY
Status: DISCONTINUED | OUTPATIENT
Start: 2020-06-23 | End: 2020-06-24

## 2020-06-23 RX ORDER — SODIUM CHLORIDE, SODIUM LACTATE, POTASSIUM CHLORIDE, CALCIUM CHLORIDE 600; 310; 30; 20 MG/100ML; MG/100ML; MG/100ML; MG/100ML
INJECTION, SOLUTION INTRAVENOUS CONTINUOUS
Status: DISCONTINUED | OUTPATIENT
Start: 2020-06-23 | End: 2020-06-23

## 2020-06-23 RX ORDER — SODIUM CHLORIDE 9 MG/ML
INJECTION, SOLUTION INTRAVENOUS CONTINUOUS
Status: DISCONTINUED | OUTPATIENT
Start: 2020-06-23 | End: 2020-06-24

## 2020-06-23 RX ORDER — PROTAMINE SULFATE 10 MG/ML
INJECTION, SOLUTION INTRAVENOUS AS NEEDED
Status: DISCONTINUED | OUTPATIENT
Start: 2020-06-23 | End: 2020-06-23 | Stop reason: SURG

## 2020-06-23 RX ORDER — METOPROLOL SUCCINATE 50 MG/1
50 TABLET, EXTENDED RELEASE ORAL DAILY
Status: DISCONTINUED | OUTPATIENT
Start: 2020-06-24 | End: 2020-06-24

## 2020-06-23 RX ORDER — CLOPIDOGREL BISULFATE 75 MG/1
75 TABLET ORAL DAILY
Status: DISCONTINUED | OUTPATIENT
Start: 2020-06-23 | End: 2020-06-24

## 2020-06-23 RX ORDER — LIDOCAINE HYDROCHLORIDE 40 MG/ML
SOLUTION TOPICAL AS NEEDED
Status: DISCONTINUED | OUTPATIENT
Start: 2020-06-23 | End: 2020-06-23 | Stop reason: SURG

## 2020-06-23 RX ORDER — ASPIRIN 81 MG/1
81 TABLET ORAL DAILY
Qty: 30 TABLET | Refills: 11 | Status: SHIPPED | OUTPATIENT
Start: 2020-06-23

## 2020-06-23 RX ORDER — AMOXICILLIN 500 MG/1
CAPSULE ORAL
Qty: 4 CAPSULE | Refills: 5 | Status: SHIPPED | OUTPATIENT
Start: 2020-06-23 | End: 2021-01-04

## 2020-06-23 RX ORDER — NALOXONE HYDROCHLORIDE 0.4 MG/ML
80 INJECTION, SOLUTION INTRAMUSCULAR; INTRAVENOUS; SUBCUTANEOUS AS NEEDED
Status: ACTIVE | OUTPATIENT
Start: 2020-06-23 | End: 2020-06-23

## 2020-06-23 RX ORDER — CEFAZOLIN SODIUM/WATER 2 G/20 ML
SYRINGE (ML) INTRAVENOUS AS NEEDED
Status: DISCONTINUED | OUTPATIENT
Start: 2020-06-23 | End: 2020-06-23 | Stop reason: SURG

## 2020-06-23 RX ORDER — METOCLOPRAMIDE HYDROCHLORIDE 5 MG/ML
10 INJECTION INTRAMUSCULAR; INTRAVENOUS AS NEEDED
Status: ACTIVE | OUTPATIENT
Start: 2020-06-23 | End: 2020-06-23

## 2020-06-23 RX ORDER — POLYETHYLENE GLYCOL 3350 17 G/17G
17 POWDER, FOR SOLUTION ORAL DAILY PRN
Status: DISCONTINUED | OUTPATIENT
Start: 2020-06-23 | End: 2020-06-24

## 2020-06-23 RX ADMIN — MIDAZOLAM HYDROCHLORIDE 2 MG: 1 INJECTION INTRAMUSCULAR; INTRAVENOUS at 09:06:00

## 2020-06-23 RX ADMIN — CEFAZOLIN SODIUM/WATER 2 G: 2 G/20 ML SYRINGE (ML) INTRAVENOUS at 09:43:00

## 2020-06-23 RX ADMIN — ROCURONIUM BROMIDE 10 MG: 10 INJECTION, SOLUTION INTRAVENOUS at 09:24:00

## 2020-06-23 RX ADMIN — ROCURONIUM BROMIDE 60 MG: 10 INJECTION, SOLUTION INTRAVENOUS at 09:20:00

## 2020-06-23 RX ADMIN — LIDOCAINE HYDROCHLORIDE 4 ML: 40 SOLUTION TOPICAL at 09:24:00

## 2020-06-23 RX ADMIN — HEPARIN SODIUM 18000 UNITS: 1000 INJECTION, SOLUTION INTRAVENOUS; SUBCUTANEOUS at 10:04:00

## 2020-06-23 RX ADMIN — ROCURONIUM BROMIDE 20 MG: 10 INJECTION, SOLUTION INTRAVENOUS at 10:56:00

## 2020-06-23 RX ADMIN — ROCURONIUM BROMIDE 20 MG: 10 INJECTION, SOLUTION INTRAVENOUS at 11:24:00

## 2020-06-23 RX ADMIN — LIDOCAINE HYDROCHLORIDE 50 MG: 10 INJECTION, SOLUTION EPIDURAL; INFILTRATION; INTRACAUDAL; PERINEURAL at 09:16:00

## 2020-06-23 RX ADMIN — SODIUM CHLORIDE: 9 INJECTION, SOLUTION INTRAVENOUS at 10:45:00

## 2020-06-23 RX ADMIN — MIDAZOLAM HYDROCHLORIDE 2 MG: 1 INJECTION INTRAMUSCULAR; INTRAVENOUS at 11:24:00

## 2020-06-23 RX ADMIN — PROTAMINE SULFATE 50 MG: 10 INJECTION, SOLUTION INTRAVENOUS at 10:34:00

## 2020-06-23 RX ADMIN — PHENYLEPHRINE HCL 50 MCG: 10 MG/ML VIAL (ML) INJECTION at 09:28:00

## 2020-06-23 RX ADMIN — SODIUM CHLORIDE: 9 INJECTION, SOLUTION INTRAVENOUS at 09:05:00

## 2020-06-23 RX ADMIN — PHENYLEPHRINE HCL 50 MCG: 10 MG/ML VIAL (ML) INJECTION at 09:59:00

## 2020-06-23 NOTE — BRIEF PROCEDURE NOTE
PROCEDURE PERFORMED: Transcatheter aortic valve replacement, 26 mm Orantes Carrie 3 aortic valve, right  transfemoral approach percutaneously. OPERATORS:   1. Cardiovascular surgeon, Dr. Royce Daniels  2.  Interventional Cardiology, Dr. Iraida Linder

## 2020-06-23 NOTE — ANESTHESIA PROCEDURE NOTES
Arterial Line  Performed by: Miesha Moyer MD  Authorized by: Miesha Moyer MD     General Information and Staff    Procedure Start:  6/23/2020 9:10 AM  Procedure End:  6/23/2020 9:15 AM  Anesthesiologist:  Miesha Moyer MD  Performed By:  Pattie Saldaña

## 2020-06-23 NOTE — ANESTHESIA POSTPROCEDURE EVALUATION
[facility]    Grady Ca Patient Status:  Inpatient   Age/Gender 81year old female MRN [id number]   [facility] 6NE-A Attending Tiffany Altman MD   Hosp Day # 1 PCP Herminio Jameson       Anesthesia Post-op Note    Procedure(s):  T

## 2020-06-23 NOTE — ANESTHESIA PROCEDURE NOTES
Airway  Date/Time: 6/23/2020 9:25 AM  Urgency: elective      General Information and Staff    Patient location during procedure: OR  Anesthesiologist: Franc Gonzalez MD  Performed: anesthesiologist     Indications and Patient Condition  Indications for ai

## 2020-06-23 NOTE — PROGRESS NOTES
Aislinn Martin Hospitalist note    PCP: Veda Comas    Chief Complaint:  F/u s/p TAVR    SUBJECTIVE:  Pt intubated, sedated  Underwent tavr earlier, had femoral stent placed   OBJECTIVE:  Temp:  [97.6 °F (36.4 °C)-99.1 °F (37.3 °C)] 97.6 °F (36.4 °C)  Pulse: Mouth/Throat Marlon@[employer]   • atorvastatin  20 mg Oral Nightly   • buPROPion HCl ER (XL)  450 mg Oral Daily   • [MAR Hold] Vitamin D3  2,000 Units Oral BID   • Pantoprazole Sodium  20 mg Oral QAM AC   • Levothyroxine Sodium  50 mcg Oral Before breakfast

## 2020-06-23 NOTE — ADDENDUM NOTE
Addendum  created 06/23/20 1227 by Jermaine Newman MD    Order list changed, Order sets accessed
Samples Given: Altreno lotion nightly to face as tolerated
Detail Level: Zone

## 2020-06-23 NOTE — OPERATIVE REPORT
CARDIOVASCULAR SURGERY  OPERATIVE NOTE  TAVR        DATE OF PROCEDURE: 6/23/20    INDICATIONS:         81year old with symptomatic aortic stenosis  Pt seen in TAVR clinic  STS Risk Score: 2.97 %         The assessment is that the patient is at low interm hemostasis and a 7mm x 75mm Viabahn   graft was deployed over the external iliac artery and proximal portion of CFA. Final angiography revealed hemostasis with preservation of distal flow.    Pulses were unchanged, and there were no complications of the

## 2020-06-23 NOTE — PLAN OF CARE
Assumed care of patient at [address]. Patient AOX4. Oxygenating >90% on RA. NSR on tele. VSS. QID accucheck. Direct admit for TAVR. Cardiac murmur auscultated. C/o sob with exertion. C/o right shoulder pain controlled with PRN Tramadol.  Consents signed, groins skin color and temperature  - Assess for signs of decreased coronary artery perfusion - ex.  Angina  - Evaluate fluid balance, assess for edema, trend weights  Outcome: Progressing  Goal: Absence of cardiac arrhythmias or at baseline  Description  INTERVENT

## 2020-06-23 NOTE — OPERATIVE REPORT
[facility]    PATIENT'S NAME: Frdia Harshad   ATTENDING PHYSICIAN: Ophelia Griffin M.D. OPERATING PHYSICIAN: Cornelius Perez.  Victor M Ruiz MD   PATIENT ACCOUNT#:   [id number]    LOCATION:  [address] [place] [address]  MEDICAL RECORD #:   [id number]       DATE OF BIRTH:  0 dilated with a 20 mm True balloon making one 5-second inflation while burst pacing at 180 beats per minute. At that point, we had only trace insufficiency by ROMEL as well as aortogram.  We therefore accepted this result and removed the catheters.   We used

## 2020-06-23 NOTE — PROGRESS NOTES
[facility] Cardiology   Post TAVR progress note    Merle Soriano Patient Status:  Inpatient    1950 MRN [id number]   Location [address] [place] [address] Attending Christopher Cavazos MD   Hosp Day # 1 PCP Lex Mackey     Date of Admission: Diagnosis Date   • Disorder of thyroid    • Esophageal reflux    • High blood pressure    • High cholesterol    • Sleep apnea    • Type II or unspecified type diabetes mellitus without mention of complication, not stated as uncontrolled    • Unspecified Not on file    Tobacco Use      Smoking status: Former Smoker        Quit date: 1995        Years since quittin.9      Smokeless tobacco: Never Used    Substance and Sexual Activity      Alcohol use: No        Frequency: Never      Drug use:  No injection, , Intravenous, PRN  propofol (DIPRIVAN) injection, , Intravenous, PRN  Lidocaine HCl (LARYNG-O-SET) 4 % solution, , Endotracheal, PRN  Rocuronium Bromide (ZEMURON) injection, , Intravenous, PRN  norepinephrine (LEVOPHED) 4 mg/250 ml premix infus 11.6* 11.3*   .0 172.0       Chem:  Recent Labs   Lab 06/22/20  1449 06/23/20  0520    140   K 4.2 3.8    108   CO2 27.0 28.0   BUN 18 18   CREATSERUM 0.88 0.80   CA 8.9 8.9   MG  --  2.0   * 146*       Recent Labs   Lab 06/22/20

## 2020-06-23 NOTE — PROCEDURES
[facility] Cardiology  Transesophageal Echocardiogram Report    PREOPERATIVE DIAGNOSIS:   Severe aortic stenosis    POSTOPERATIVE DIAGNOSIS:  Transcatheter aortic valve replacement    PROCEDURE PERFORMED: Transesophageal echocardiogram with Doppl

## 2020-06-23 NOTE — PROGRESS NOTES
Received care of patient at 0730 alert and oriented x4. Vitals stable, NSR on telemetry. Tramadol administered for shoulder pain. IV fluids infusing as ordered. Benadryl and Solu-Medrol administered as ordered pre-procedure.  Patient taken down at 0900 for

## 2020-06-24 ENCOUNTER — APPOINTMENT (OUTPATIENT)
Dept: CV DIAGNOSTICS | Facility: HOSPITAL | Age: 70
DRG: 267 | End: 2020-06-24
Attending: NURSE PRACTITIONER
Payer: MEDICARE

## 2020-06-24 VITALS
TEMPERATURE: 98 F | DIASTOLIC BLOOD PRESSURE: 45 MMHG | BODY MASS INDEX: 37 KG/M2 | SYSTOLIC BLOOD PRESSURE: 115 MMHG | WEIGHT: 204.38 LBS | RESPIRATION RATE: 22 BRPM | OXYGEN SATURATION: 94 % | HEART RATE: 90 BPM

## 2020-06-24 PROBLEM — Z95.3 S/P TAVR (TRANSCATHETER AORTIC VALVE REPLACEMENT), BIOPROSTHETIC: Status: ACTIVE | Noted: 2020-06-24

## 2020-06-24 LAB
ANION GAP SERPL CALC-SCNC: 9 MMOL/L (ref 0–18)
ATRIAL RATE: 80 BPM
BLOOD TYPE BARCODE: 9500
BUN BLD-MCNC: 14 MG/DL (ref 7–18)
BUN/CREAT SERPL: 17.9 (ref 10–20)
CALCIUM BLD-MCNC: 7.6 MG/DL (ref 8.5–10.1)
CHLORIDE SERPL-SCNC: 113 MMOL/L (ref 98–112)
CO2 SERPL-SCNC: 19 MMOL/L (ref 21–32)
CREAT BLD-MCNC: 0.78 MG/DL (ref 0.55–1.02)
DEPRECATED RDW RBC AUTO: 43.1 FL (ref 35.1–46.3)
ERYTHROCYTE [DISTWIDTH] IN BLOOD BY AUTOMATED COUNT: 14.4 % (ref 11–15)
GLUCOSE BLD-MCNC: 174 MG/DL (ref 70–99)
GLUCOSE BLD-MCNC: 258 MG/DL (ref 70–99)
GLUCOSE BLD-MCNC: 283 MG/DL (ref 70–99)
GLUCOSE BLD-MCNC: 320 MG/DL (ref 70–99)
HCT VFR BLD AUTO: 30.6 % (ref 35–48)
HGB BLD-MCNC: 10.1 G/DL (ref 12–16)
MCH RBC QN AUTO: 27.3 PG (ref 26–34)
MCHC RBC AUTO-ENTMCNC: 33 G/DL (ref 31–37)
MCV RBC AUTO: 82.7 FL (ref 80–100)
OSMOLALITY SERPL CALC.SUM OF ELEC: 303 MOSM/KG (ref 275–295)
P AXIS: 51 DEGREES
P-R INTERVAL: 152 MS
PLATELET # BLD AUTO: 151 10(3)UL (ref 150–450)
POTASSIUM SERPL-SCNC: 4 MMOL/L (ref 3.5–5.1)
Q-T INTERVAL: 392 MS
QRS DURATION: 74 MS
QTC CALCULATION (BEZET): 452 MS
R AXIS: 18 DEGREES
RBC # BLD AUTO: 3.7 X10(6)UL (ref 3.8–5.3)
SODIUM SERPL-SCNC: 141 MMOL/L (ref 136–145)
T AXIS: 8 DEGREES
VENTRICULAR RATE: 80 BPM
WBC # BLD AUTO: 12.6 X10(3) UL (ref 4–11)

## 2020-06-24 PROCEDURE — 80048 BASIC METABOLIC PNL TOTAL CA: CPT | Performed by: NURSE PRACTITIONER

## 2020-06-24 PROCEDURE — 93005 ELECTROCARDIOGRAM TRACING: CPT

## 2020-06-24 PROCEDURE — 82962 GLUCOSE BLOOD TEST: CPT

## 2020-06-24 PROCEDURE — 93010 ELECTROCARDIOGRAM REPORT: CPT | Performed by: INTERNAL MEDICINE

## 2020-06-24 PROCEDURE — 93308 TTE F-UP OR LMTD: CPT | Performed by: NURSE PRACTITIONER

## 2020-06-24 PROCEDURE — 85027 COMPLETE CBC AUTOMATED: CPT | Performed by: NURSE PRACTITIONER

## 2020-06-24 RX ORDER — CLOPIDOGREL BISULFATE 75 MG/1
75 TABLET ORAL DAILY
Qty: 30 TABLET | Refills: 3 | Status: SHIPPED | OUTPATIENT
Start: 2020-06-25 | End: 2020-10-29

## 2020-06-24 RX ORDER — TRAMADOL HYDROCHLORIDE 50 MG/1
50 TABLET ORAL EVERY 6 HOURS PRN
Qty: 20 TABLET | Refills: 0 | Status: SHIPPED | OUTPATIENT
Start: 2020-06-24 | End: 2020-07-01 | Stop reason: ALTCHOICE

## 2020-06-24 NOTE — PLAN OF CARE
Received patient at 0730 awake and alert echo done. Dr. Hermilo Lomeli at bedside discuss echo and discharge follow up with patient. Removed right groin dressing. Patient up in chair for breakfast.  Walked hallway.   Right groin tender to touch soft no he

## 2020-06-24 NOTE — DIETARY NOTE
[facility] [address]     Admitting diagnosis:  aortic stenosis    Ht:  5'2\"  Wt: 92.7 kg (204 lb 5.9 oz). This is 185 % of IBW  Body mass index is 37.38 kg/m².   IBW: 50kg    Labs/Meds reviewed    Diet: Orders Placed This

## 2020-06-24 NOTE — PLAN OF CARE
Assumed care at [address] [place] [address] Road. No acute events over night with bilateral groin remained stable- no active bleeding or hematoma; pedal pulses strong. SR; VSS.     0630-First time getting up to the chair, patient got dizzy & nauseated w/ BP=82/60 (60).  IVF re-start

## 2020-06-24 NOTE — PLAN OF CARE
Pt given post TAVR discharge instructions. Reviewed medications, side effects and activity restrictions. Pt aware of follow up appt. Taken down via wc.

## 2020-06-24 NOTE — PROGRESS NOTES
David Zhou Hospitalist note    PCP: Clarita Max    Chief Complaint:  F/u s/p TAVR    SUBJECTIVE:  Sitting up in chair, feels sore.      OBJECTIVE:  Temp:  [97.6 °F (36.4 °C)-98.8 °F (37.1 °C)] 98.6 °F (37 °C)  Pulse:  [] 85  Resp:  [11-21] 19  BP: aspirin  81 mg Oral Daily   • Clopidogrel Bisulfate  75 mg Oral Daily   • insulin detemir  30 Units Subcutaneous Nightly   • atorvastatin  20 mg Oral Nightly   • buPROPion HCl ER (XL)  450 mg Oral Daily   • [MAR Hold] Vitamin D3  2,000 Units Oral BID   • P

## 2020-06-24 NOTE — PROGRESS NOTES
[facility] Cardiology  Progress Note    Karmen Bodily Patient Status:  Inpatient    1950 MRN [id number]   [facility] 6NE-A Attending Meme Stanford MD   Hosp Day # 2 PCP Keshawn Reese     Impression:  · Severe nonrheuma (Toprol XL) 24 hr tab 50 mg, 50 mg, Oral, Daily  DOBUTamine in D5W (DOBUTREX) 500 mg/250 ml infusion, 2.5-40 mcg/kg/min (Dosing Weight), Intravenous, Continuous PRN  norepinephrine (LEVOPHED) 4 mg/250 ml premix infusion, 0.5-30 mcg/min, Intravenous, Contin g, Oral, Q15 Min PRN    Or  Glucose-Vitamin C (DEX-4) chewable tab 4 tablet, 4 tablet, Oral, Q15 Min PRN    Or  dextrose 50 % injection 50 mL, 50 mL, Intravenous, Q15 Min PRN    Or  glucose (DEX4) oral liquid 30 g, 30 g, Oral, Q15 Min PRN    Or  Glucose-Vi

## 2020-06-25 LAB — BLOOD TYPE BARCODE: 9500

## 2020-06-30 NOTE — CDS QUERY
Cherie Ayers   1950  DOS 20-20  [facility] [id number]  [id number]  Clarification of Operative Procedure  CLINICAL DOCUMENTATION CLARIFICATION FORM  Dear Doctor Dameon Olivas,   Clinical information (provided below) suggests bleeding during a procedure

## 2020-07-02 NOTE — DISCHARGE SUMMARY
Hospital course: The patient underwent TAVR on 6/23/2020 with a 26mm Medtronic Evolut Pro Plus valve.  Due to persistent bleeding from the right femoral artery, a 7 X 75mm Viabahn stent graft deployed in the right external iliac artery and proximal common f

## 2020-07-14 PROBLEM — R06.02 SOB (SHORTNESS OF BREATH): Status: ACTIVE | Noted: 2018-08-31

## 2020-09-17 RX ORDER — CLOPIDOGREL BISULFATE 75 MG/1
TABLET ORAL
Qty: 90 TABLET | Refills: 1 | OUTPATIENT
Start: 2020-09-17

## 2020-09-17 NOTE — TELEPHONE ENCOUNTER
Spoke with King Baires pt was to only take Plavix for 90 days per Magen Mcdermott  Requested Prescriptions     Refused Prescriptions Disp Refills   • CLOPIDOGREL BISULFATE 75 MG Oral Tab [Pharmacy Med Name: CLOPIDOGREL 75 MG TABLET] 90 tablet 1     Sig: TAKE 1 T

## 2020-10-29 PROBLEM — I70.0 ATHEROSCLEROSIS OF AORTA (HCC): Status: ACTIVE | Noted: 2020-10-29

## 2021-01-04 PROBLEM — F43.9 STRESS: Status: ACTIVE | Noted: 2021-01-04

## 2021-01-04 PROBLEM — F42.3 HOARDING BEHAVIOR: Status: ACTIVE | Noted: 2021-01-04

## 2021-01-04 PROBLEM — F33.9 RECURRENT MAJOR DEPRESSIVE EPISODES (HCC): Status: ACTIVE | Noted: 2021-01-04

## 2021-01-04 PROBLEM — F41.9 ANXIETY: Status: ACTIVE | Noted: 2021-01-04

## 2021-01-04 PROBLEM — F90.9 ADULT ATTENTION DEFICIT HYPERACTIVITY DISORDER: Status: ACTIVE | Noted: 2021-01-04

## 2021-04-05 PROBLEM — F41.1 GAD (GENERALIZED ANXIETY DISORDER): Status: ACTIVE | Noted: 2021-04-05

## 2021-04-05 PROBLEM — F90.0 ATTENTION DEFICIT HYPERACTIVITY DISORDER (ADHD), PREDOMINANTLY INATTENTIVE TYPE: Status: ACTIVE | Noted: 2021-04-05

## 2021-05-19 NOTE — LETTER
[facility] [facility] FOR BLOOD OR BLOOD COMPONENTS      In the course of your treatment, it may become necessary to administer a transfusion of blood or blood components.  This form provides basic information concerning this proc A [employer] page was sent to Dr. Callahan.  The page included the following message: [phone] FROM: IRMA GUERRERO [facility] CLINICAL CONTACT C[facility] RE: KINJAL MUNIZ MRN: [id number] 1962 LAB RESULTS. [facility]ER[facility]D [facility]      explain the alternatives to you if it has not already been done. I,Lisa Pfeiffer, have read/had read to me the above. I understand the matters bearing on the decision whether or not to authorize a transfusion of blood or blood components.  I have no ques

## (undated) DEVICE — REM POLYHESIVE ADULT PATIENT RETURN ELECTRODE: Brand: VALLEYLAB

## (undated) DEVICE — 3M™ TEGADERM™ TRANSPARENT FILM DRESSING, 1626W, 4 IN X 4-3/4 IN (10 CM X 12 CM), 50 EACH/CARTON, 4 CARTON/CASE: Brand: 3M™ TEGADERM™

## (undated) DEVICE — TOWEL OR BLU 16X26 STRL

## (undated) DEVICE — SPONGE LAP 18X18 XRAY STRL

## (undated) DEVICE — GOWN,SIRUS,FABRIC-REINFORCED,X-LARGE: Brand: MEDLINE

## (undated) DEVICE — PANNUS RETENTION SYSTEM

## (undated) DEVICE — CSTM UNIVERSAL DRAPE PK: Brand: MEDLINE INDUSTRIES, INC.

## (undated) DEVICE — 1010 S-DRAPE TOWEL DRAPE 10/BX: Brand: STERI-DRAPE™

## (undated) DEVICE — 3M™ BAIR HUGGER® UNDERBODY BLANKET, FULL ACCESS, 10 PER CASE 63500: Brand: BAIR HUGGER™

## (undated) DEVICE — STERILE POLYISOPRENE POWDER-FREE SURGICAL GLOVES: Brand: PROTEXIS

## (undated) DEVICE — 3M™ IOBAN™ 2 ANTIMICROBIAL INCISE DRAPE 6651EZ: Brand: IOBAN™ 2

## (undated) DEVICE — SUTURE SILK 0 FSL

## (undated) DEVICE — SOLUTION SURG DURA PREP HAZMAT

## (undated) DEVICE — STANDARD HYPODERMIC NEEDLE,POLYPROPYLENE HUB: Brand: MONOJECT

## (undated) DEVICE — TRANSPOSAL ULTRAFLEX DUO/QUAD ULTRA CART MANIFOLD

## (undated) DEVICE — DRAPE TABLE COVER 44X90 TC-10

## (undated) DEVICE — TIBURON DRAPE TOWELS: Brand: CONVERTORS

## (undated) DEVICE — ADULT, RADIOTRANSPARENT ELEMENT, COMPATIBLE W/ GRAY FLAT CONNECTOR: Brand: DEFIBRILLATION ELECTRODES

## (undated) DEVICE — X-RAY DETECTABLE SPONGES,16 PLY: Brand: VISTEC

## (undated) DEVICE — GAUZE SPONGES,12 PLY: Brand: CURITY

## (undated) DEVICE — GLOVE SURG TRIUMPH SZ 8

## (undated) DEVICE — SYRINGE 30ML LL TIP

## (undated) NOTE — MR AVS SNAPSHOT
After Visit Summary   5/19/2020    Karla Conor    MRN: [id number]           Visit Information     Date & Time  5/19/2020 11:00 AM Provider   [facility] Outpatient Respiratory Therapy Dept.  Phone  [phone] patients. Video Visits are available Monday - Friday for many common conditions such as allergies, colds, cough, fever, rash, sore throat, headache and pink eye.   The cost for a Video Visit is currently $35.         If you receive a survey from [employer] Saturday – Sunday  10:00 am – 4:00 pm  WALK-IN CARE  Emergency Medicine Providers  Conditions needing urgent attention, but are   non-life-threatening.     Also available by appointment Average cost  $120*     EMERGENCY ROOM Life-threatening emergencies nee

## (undated) NOTE — LETTER
[facility] [address], [address] [place] [address]    Consent for Anesthesia   1.   IJosé Miguel agree to be cared for by a physician anesthesiologist alone and/or with a nurse anesthetist, who is specially trained to monitor me and give me allergic reactions to medications, injury to my airway, heart, lungs, vision, nerves, or muscles and in extremely rare instances death. 5. My doctor has explained to me other choices available to me for my care (alternatives).   6. Pregnant Patients (“epid Printed: 6/22/2020 at 2:33 PM    Medical Record #: [id number]                                            Page 1 of 1

## (undated) NOTE — LETTER
Gordon Dutton M.D., F.A.C.S. Jerome Mccain M.D., F.A.C.S. Brant Morse M.D., Erica Pickett. ALEXSANDER Stern M.D., F.A.C.S. Helio Brian. Hermilo Avilez M.D., F.A.C.S. Toney Gamble M.D. SHELLEY Koo M.D., F. chance to have all of your questions and concerns answered. If there are any issues which have not been adequately addressed, we ask you to bring them forward so that we can thoroughly address them.     A patient who is fully informed and understands their treatment, among other options and the risks and benefits of the different treatment options:    Yes _____ No _____    A [facility] surgeon as explained to me that if I should so desire, he/she is willing to explain my case and the surgical and non-surgical optio

## (undated) NOTE — LETTER
Ophelia Horner [address] E  [place], [address] [place] [address]    Consent for Operation  Date: __________________                                Time: _______________    1.  I authorize the performance upon Wanda Archuleta the following operation:  Procedure revealed by the pictures or by descriptive texts accompanying them. If the procedure has been videotaped, the surgeon will obtain the original videotape. The hospital will not be responsible for storage or maintenance of this tape.   7. For the purpose of a THAT MY DOCTOR PROVIDED ME WITH THE ABOVE EXPLANATIONS, THAT ALL BLANKS OR STATEMENTS REQUIRING INSERTION OR COMPLETION WERE FILLED IN.     Signature of Patient:   ___________________________    When the patient is a minor or mentally incompetent to give co iii. All of the medicines I take (including prescriptions, herbal supplements, and pills I can buy without a prescription (including street drugs/illegal medications).  Failure to inform my anesthesiologist about these medicines may increase my risk of anes _____________________________________________________________________________  Anesthesiologist Signature     Date   Time  I have discussed the procedure and information above with the patient (or patient’s representative) and answered their questions.  The

## (undated) NOTE — LETTER
[facility] [address], [address]  Consent for Procedure/Sedation    Date: 6/22/2020    Time: _____      1. I authorize the performance upon Kenneth Clement the following:  Transcatheter aortic valve replacement     2.  I authorize Signature of person authorized to consent for patient:        Relationship to patient:    ________________________________    ___________________    Witness: _________________________      Date: ___________________    Printed: 6/18/2020   11:20 AM  Patient